# Patient Record
Sex: FEMALE | Race: WHITE | Employment: FULL TIME | URBAN - METROPOLITAN AREA
[De-identification: names, ages, dates, MRNs, and addresses within clinical notes are randomized per-mention and may not be internally consistent; named-entity substitution may affect disease eponyms.]

---

## 2023-08-28 LAB
ABO, EXTERNAL RESULT: NORMAL
HEP B, EXTERNAL RESULT: NORMAL
HIV, EXTERNAL RESULT: NORMAL
N. GONORRHOEAE, EXTERNAL RESULT: NEGATIVE
RUBELLA TITER, EXTERNAL RESULT: NORMAL
T. PALLIDUM (SYPHILIS) ANTIBODY, EXTERNAL RESULT: NORMAL

## 2024-03-02 LAB — GBS, EXTERNAL RESULT: POSITIVE

## 2024-04-01 ENCOUNTER — ANESTHESIA EVENT (OUTPATIENT)
Dept: LABOR AND DELIVERY | Facility: HOSPITAL | Age: 29
End: 2024-04-01
Payer: COMMERCIAL

## 2024-04-01 ENCOUNTER — ANESTHESIA (OUTPATIENT)
Dept: LABOR AND DELIVERY | Facility: HOSPITAL | Age: 29
End: 2024-04-01
Payer: COMMERCIAL

## 2024-04-01 ENCOUNTER — HOSPITAL ENCOUNTER (INPATIENT)
Facility: HOSPITAL | Age: 29
LOS: 4 days | Discharge: HOME | End: 2024-04-05
Attending: OBSTETRICS & GYNECOLOGY | Admitting: OBSTETRICS & GYNECOLOGY
Payer: COMMERCIAL

## 2024-04-01 LAB
AMPHET UR QL SCN: NEGATIVE
BARBITURATES UR QL SCN: NEGATIVE
BASOPHILS # BLD: 0 K/UL (ref 0–0.1)
BASOPHILS NFR BLD: 0 % (ref 0–1)
BENZODIAZ UR QL: NEGATIVE
CANNABINOIDS UR QL SCN: NEGATIVE
COCAINE UR QL SCN: NEGATIVE
DIFFERENTIAL METHOD BLD: ABNORMAL
EOSINOPHIL # BLD: 0 K/UL (ref 0–0.4)
EOSINOPHIL NFR BLD: 0 % (ref 0–7)
ERYTHROCYTE [DISTWIDTH] IN BLOOD BY AUTOMATED COUNT: 12.4 % (ref 11.5–14.5)
HCT VFR BLD AUTO: 39 % (ref 35–47)
HGB BLD-MCNC: 13.7 G/DL (ref 11.5–16)
IMM GRANULOCYTES # BLD AUTO: 0.1 K/UL (ref 0–0.04)
IMM GRANULOCYTES NFR BLD AUTO: 1 % (ref 0–0.5)
LYMPHOCYTES # BLD: 2.3 K/UL (ref 0.8–3.5)
LYMPHOCYTES NFR BLD: 21 % (ref 12–49)
Lab: NORMAL
MCH RBC QN AUTO: 33.7 PG (ref 26–34)
MCHC RBC AUTO-ENTMCNC: 35.1 G/DL (ref 30–36.5)
MCV RBC AUTO: 96.1 FL (ref 80–99)
METHADONE UR QL: NEGATIVE
MONOCYTES # BLD: 0.8 K/UL (ref 0–1)
MONOCYTES NFR BLD: 7 % (ref 5–13)
NEUTS SEG # BLD: 7.5 K/UL (ref 1.8–8)
NEUTS SEG NFR BLD: 71 % (ref 32–75)
NRBC # BLD: 0 K/UL (ref 0–0.01)
NRBC BLD-RTO: 0 PER 100 WBC
OPIATES UR QL: NEGATIVE
PCP UR QL: NEGATIVE
PLATELET # BLD AUTO: 175 K/UL (ref 150–400)
PMV BLD AUTO: 11.1 FL (ref 8.9–12.9)
RBC # BLD AUTO: 4.06 M/UL (ref 3.8–5.2)
WBC # BLD AUTO: 10.8 K/UL (ref 3.6–11)

## 2024-04-01 PROCEDURE — 36415 COLL VENOUS BLD VENIPUNCTURE: CPT

## 2024-04-01 PROCEDURE — 3700000025 EPIDURAL BLOCK: Performed by: ANESTHESIOLOGY

## 2024-04-01 PROCEDURE — 86901 BLOOD TYPING SEROLOGIC RH(D): CPT

## 2024-04-01 PROCEDURE — 86850 RBC ANTIBODY SCREEN: CPT

## 2024-04-01 PROCEDURE — 2500000003 HC RX 250 WO HCPCS

## 2024-04-01 PROCEDURE — 1120000000 HC RM PRIVATE OB

## 2024-04-01 PROCEDURE — 6360000002 HC RX W HCPCS: Performed by: ANESTHESIOLOGY

## 2024-04-01 PROCEDURE — 2580000003 HC RX 258: Performed by: OBSTETRICS & GYNECOLOGY

## 2024-04-01 PROCEDURE — 7210000100 HC LABOR FEE PER 1 HR

## 2024-04-01 PROCEDURE — 86900 BLOOD TYPING SEROLOGIC ABO: CPT

## 2024-04-01 PROCEDURE — 80307 DRUG TEST PRSMV CHEM ANLYZR: CPT

## 2024-04-01 PROCEDURE — 00HU33Z INSERTION OF INFUSION DEVICE INTO SPINAL CANAL, PERCUTANEOUS APPROACH: ICD-10-PCS | Performed by: ANESTHESIOLOGY

## 2024-04-01 PROCEDURE — 99203 OFFICE O/P NEW LOW 30 MIN: CPT

## 2024-04-01 PROCEDURE — 6360000002 HC RX W HCPCS: Performed by: OBSTETRICS & GYNECOLOGY

## 2024-04-01 PROCEDURE — 85025 COMPLETE CBC W/AUTO DIFF WBC: CPT

## 2024-04-01 RX ORDER — DOCUSATE SODIUM 100 MG/1
100 CAPSULE, LIQUID FILLED ORAL 2 TIMES DAILY
Status: DISCONTINUED | OUTPATIENT
Start: 2024-04-01 | End: 2024-04-02

## 2024-04-01 RX ORDER — FENTANYL/BUPIVACAINE/NS/PF 2-1250MCG
PLASTIC BAG, INJECTION (ML) INJECTION
Status: COMPLETED
Start: 2024-04-01 | End: 2024-04-01

## 2024-04-01 RX ORDER — MISOPROSTOL 200 UG/1
400 TABLET ORAL PRN
Status: DISCONTINUED | OUTPATIENT
Start: 2024-04-01 | End: 2024-04-02

## 2024-04-01 RX ORDER — SODIUM CHLORIDE, SODIUM LACTATE, POTASSIUM CHLORIDE, AND CALCIUM CHLORIDE .6; .31; .03; .02 G/100ML; G/100ML; G/100ML; G/100ML
500 INJECTION, SOLUTION INTRAVENOUS PRN
Status: DISCONTINUED | OUTPATIENT
Start: 2024-04-01 | End: 2024-04-02

## 2024-04-01 RX ORDER — ONDANSETRON 4 MG/1
4 TABLET, ORALLY DISINTEGRATING ORAL EVERY 6 HOURS PRN
Status: DISCONTINUED | OUTPATIENT
Start: 2024-04-01 | End: 2024-04-02

## 2024-04-01 RX ORDER — FENTANYL/BUPIVACAINE/NS/PF 2-1250MCG
12 PLASTIC BAG, INJECTION (ML) INJECTION CONTINUOUS
Status: DISCONTINUED | OUTPATIENT
Start: 2024-04-02 | End: 2024-04-02

## 2024-04-01 RX ORDER — SODIUM CHLORIDE, SODIUM LACTATE, POTASSIUM CHLORIDE, AND CALCIUM CHLORIDE .6; .31; .03; .02 G/100ML; G/100ML; G/100ML; G/100ML
1000 INJECTION, SOLUTION INTRAVENOUS PRN
Status: DISCONTINUED | OUTPATIENT
Start: 2024-04-01 | End: 2024-04-02

## 2024-04-01 RX ORDER — CARBOPROST TROMETHAMINE 250 UG/ML
250 INJECTION, SOLUTION INTRAMUSCULAR PRN
Status: DISCONTINUED | OUTPATIENT
Start: 2024-04-01 | End: 2024-04-02

## 2024-04-01 RX ORDER — SODIUM CHLORIDE 9 MG/ML
25 INJECTION, SOLUTION INTRAVENOUS PRN
Status: DISCONTINUED | OUTPATIENT
Start: 2024-04-01 | End: 2024-04-02

## 2024-04-01 RX ORDER — SODIUM CHLORIDE 0.9 % (FLUSH) 0.9 %
5-40 SYRINGE (ML) INJECTION PRN
Status: DISCONTINUED | OUTPATIENT
Start: 2024-04-01 | End: 2024-04-02

## 2024-04-01 RX ORDER — TERBUTALINE SULFATE 1 MG/ML
0.25 INJECTION, SOLUTION SUBCUTANEOUS
Status: DISCONTINUED | OUTPATIENT
Start: 2024-04-01 | End: 2024-04-02

## 2024-04-01 RX ORDER — NALOXONE HYDROCHLORIDE 0.4 MG/ML
INJECTION, SOLUTION INTRAMUSCULAR; INTRAVENOUS; SUBCUTANEOUS PRN
Status: DISCONTINUED | OUTPATIENT
Start: 2024-04-01 | End: 2024-04-02

## 2024-04-01 RX ORDER — METHYLERGONOVINE MALEATE 0.2 MG/ML
200 INJECTION INTRAVENOUS PRN
Status: DISCONTINUED | OUTPATIENT
Start: 2024-04-01 | End: 2024-04-02

## 2024-04-01 RX ORDER — FENTANYL CITRATE 50 UG/ML
25 INJECTION, SOLUTION INTRAMUSCULAR; INTRAVENOUS
Status: DISCONTINUED | OUTPATIENT
Start: 2024-04-01 | End: 2024-04-02

## 2024-04-01 RX ORDER — BUPIVACAINE HYDROCHLORIDE 2.5 MG/ML
INJECTION, SOLUTION EPIDURAL; INFILTRATION; INTRACAUDAL
Status: COMPLETED
Start: 2024-04-01 | End: 2024-04-01

## 2024-04-01 RX ORDER — ONDANSETRON 2 MG/ML
4 INJECTION INTRAMUSCULAR; INTRAVENOUS EVERY 6 HOURS PRN
Status: DISCONTINUED | OUTPATIENT
Start: 2024-04-01 | End: 2024-04-02

## 2024-04-01 RX ORDER — BUPIVACAINE HYDROCHLORIDE 2.5 MG/ML
INJECTION, SOLUTION EPIDURAL; INFILTRATION; INTRACAUDAL PRN
Status: DISCONTINUED | OUTPATIENT
Start: 2024-04-01 | End: 2024-04-02 | Stop reason: SDUPTHER

## 2024-04-01 RX ORDER — SODIUM CHLORIDE, SODIUM LACTATE, POTASSIUM CHLORIDE, CALCIUM CHLORIDE 600; 310; 30; 20 MG/100ML; MG/100ML; MG/100ML; MG/100ML
INJECTION, SOLUTION INTRAVENOUS CONTINUOUS
Status: DISCONTINUED | OUTPATIENT
Start: 2024-04-01 | End: 2024-04-02

## 2024-04-01 RX ORDER — SODIUM CHLORIDE 0.9 % (FLUSH) 0.9 %
5-40 SYRINGE (ML) INJECTION EVERY 12 HOURS SCHEDULED
Status: DISCONTINUED | OUTPATIENT
Start: 2024-04-01 | End: 2024-04-02

## 2024-04-01 RX ADMIN — Medication 12 ML/HR: at 23:50

## 2024-04-01 RX ADMIN — BUPIVACAINE HYDROCHLORIDE 20 ML: 2.5 INJECTION, SOLUTION EPIDURAL; INFILTRATION; INTRACAUDAL; PERINEURAL at 23:39

## 2024-04-01 RX ADMIN — SODIUM CHLORIDE 5 MILLION UNITS: 900 INJECTION INTRAVENOUS at 22:48

## 2024-04-01 RX ADMIN — SODIUM CHLORIDE, POTASSIUM CHLORIDE, SODIUM LACTATE AND CALCIUM CHLORIDE 1000 ML: 600; 310; 30; 20 INJECTION, SOLUTION INTRAVENOUS at 22:47

## 2024-04-02 LAB
ABO + RH BLD: NORMAL
BLOOD GROUP ANTIBODIES SERPL: NORMAL
SPECIMEN EXP DATE BLD: NORMAL

## 2024-04-02 PROCEDURE — 3700000156 HC EPIDURAL ANESTHESIA

## 2024-04-02 PROCEDURE — 6360000002 HC RX W HCPCS: Performed by: REGISTERED NURSE

## 2024-04-02 PROCEDURE — 2500000003 HC RX 250 WO HCPCS: Performed by: REGISTERED NURSE

## 2024-04-02 PROCEDURE — 2580000003 HC RX 258: Performed by: REGISTERED NURSE

## 2024-04-02 PROCEDURE — 2500000003 HC RX 250 WO HCPCS: Performed by: ANESTHESIOLOGY

## 2024-04-02 PROCEDURE — 7100000000 HC PACU RECOVERY - FIRST 15 MIN: Performed by: STUDENT IN AN ORGANIZED HEALTH CARE EDUCATION/TRAINING PROGRAM

## 2024-04-02 PROCEDURE — 1120000000 HC RM PRIVATE OB

## 2024-04-02 PROCEDURE — 6360000002 HC RX W HCPCS: Performed by: STUDENT IN AN ORGANIZED HEALTH CARE EDUCATION/TRAINING PROGRAM

## 2024-04-02 PROCEDURE — 2709999900 HC NON-CHARGEABLE SUPPLY: Performed by: STUDENT IN AN ORGANIZED HEALTH CARE EDUCATION/TRAINING PROGRAM

## 2024-04-02 PROCEDURE — 2580000003 HC RX 258: Performed by: STUDENT IN AN ORGANIZED HEALTH CARE EDUCATION/TRAINING PROGRAM

## 2024-04-02 PROCEDURE — 2580000003 HC RX 258: Performed by: OBSTETRICS & GYNECOLOGY

## 2024-04-02 PROCEDURE — 7100000001 HC PACU RECOVERY - ADDTL 15 MIN: Performed by: STUDENT IN AN ORGANIZED HEALTH CARE EDUCATION/TRAINING PROGRAM

## 2024-04-02 PROCEDURE — 10H07YZ INSERTION OF OTHER DEVICE INTO PRODUCTS OF CONCEPTION, VIA NATURAL OR ARTIFICIAL OPENING: ICD-10-PCS | Performed by: OBSTETRICS & GYNECOLOGY

## 2024-04-02 PROCEDURE — 6360000002 HC RX W HCPCS: Performed by: OBSTETRICS & GYNECOLOGY

## 2024-04-02 PROCEDURE — 76815 OB US LIMITED FETUS(S): CPT

## 2024-04-02 PROCEDURE — 3700000000 HC ANESTHESIA ATTENDED CARE: Performed by: STUDENT IN AN ORGANIZED HEALTH CARE EDUCATION/TRAINING PROGRAM

## 2024-04-02 PROCEDURE — 3609079900 HC CESAREAN SECTION: Performed by: STUDENT IN AN ORGANIZED HEALTH CARE EDUCATION/TRAINING PROGRAM

## 2024-04-02 PROCEDURE — 99465 NB RESUSCITATION: CPT

## 2024-04-02 PROCEDURE — 36415 COLL VENOUS BLD VENIPUNCTURE: CPT

## 2024-04-02 PROCEDURE — 51702 INSERT TEMP BLADDER CATH: CPT

## 2024-04-02 PROCEDURE — 7210000100 HC LABOR FEE PER 1 HR

## 2024-04-02 PROCEDURE — 3700000001 HC ADD 15 MINUTES (ANESTHESIA): Performed by: STUDENT IN AN ORGANIZED HEALTH CARE EDUCATION/TRAINING PROGRAM

## 2024-04-02 RX ORDER — ONDANSETRON 2 MG/ML
4 INJECTION INTRAMUSCULAR; INTRAVENOUS EVERY 6 HOURS PRN
Status: DISCONTINUED | OUTPATIENT
Start: 2024-04-02 | End: 2024-04-05 | Stop reason: HOSPADM

## 2024-04-02 RX ORDER — SODIUM CHLORIDE 0.9 % (FLUSH) 0.9 %
5-40 SYRINGE (ML) INJECTION PRN
Status: DISCONTINUED | OUTPATIENT
Start: 2024-04-02 | End: 2024-04-05 | Stop reason: HOSPADM

## 2024-04-02 RX ORDER — FENTANYL CITRATE 50 UG/ML
INJECTION, SOLUTION INTRAMUSCULAR; INTRAVENOUS PRN
Status: DISCONTINUED | OUTPATIENT
Start: 2024-04-02 | End: 2024-04-02 | Stop reason: SDUPTHER

## 2024-04-02 RX ORDER — ACETAMINOPHEN 500 MG
1000 TABLET ORAL EVERY 8 HOURS
Status: DISCONTINUED | OUTPATIENT
Start: 2024-04-03 | End: 2024-04-05 | Stop reason: HOSPADM

## 2024-04-02 RX ORDER — CYCLOBENZAPRINE HCL 10 MG
10 TABLET ORAL 3 TIMES DAILY PRN
Status: DISCONTINUED | OUTPATIENT
Start: 2024-04-02 | End: 2024-04-05 | Stop reason: HOSPADM

## 2024-04-02 RX ORDER — AZITHROMYCIN 500 MG/1
INJECTION, POWDER, LYOPHILIZED, FOR SOLUTION INTRAVENOUS
Status: DISCONTINUED
Start: 2024-04-02 | End: 2024-04-02

## 2024-04-02 RX ORDER — SODIUM CHLORIDE 0.9 % (FLUSH) 0.9 %
5-40 SYRINGE (ML) INJECTION EVERY 12 HOURS SCHEDULED
Status: DISCONTINUED | OUTPATIENT
Start: 2024-04-02 | End: 2024-04-05 | Stop reason: HOSPADM

## 2024-04-02 RX ORDER — LIDOCAINE HCL/EPINEPHRINE/PF 2%-1:200K
VIAL (ML) INJECTION PRN
Status: DISCONTINUED | OUTPATIENT
Start: 2024-04-02 | End: 2024-04-02 | Stop reason: SDUPTHER

## 2024-04-02 RX ORDER — OXYCODONE HYDROCHLORIDE 5 MG/1
5 TABLET ORAL EVERY 4 HOURS PRN
Status: DISCONTINUED | OUTPATIENT
Start: 2024-04-02 | End: 2024-04-03

## 2024-04-02 RX ORDER — ACETAMINOPHEN 500 MG
1000 TABLET ORAL EVERY 8 HOURS SCHEDULED
Status: DISCONTINUED | OUTPATIENT
Start: 2024-04-02 | End: 2024-04-02

## 2024-04-02 RX ORDER — DEXAMETHASONE SODIUM PHOSPHATE 4 MG/ML
INJECTION, SOLUTION INTRA-ARTICULAR; INTRALESIONAL; INTRAMUSCULAR; INTRAVENOUS; SOFT TISSUE PRN
Status: DISCONTINUED | OUTPATIENT
Start: 2024-04-02 | End: 2024-04-02 | Stop reason: SDUPTHER

## 2024-04-02 RX ORDER — KETOROLAC TROMETHAMINE 30 MG/ML
INJECTION, SOLUTION INTRAMUSCULAR; INTRAVENOUS PRN
Status: DISCONTINUED | OUTPATIENT
Start: 2024-04-02 | End: 2024-04-02 | Stop reason: SDUPTHER

## 2024-04-02 RX ORDER — LANOLIN/MINERAL OIL
LOTION (ML) TOPICAL
Status: DISCONTINUED | OUTPATIENT
Start: 2024-04-02 | End: 2024-04-05 | Stop reason: HOSPADM

## 2024-04-02 RX ORDER — DOCUSATE SODIUM 100 MG/1
100 CAPSULE, LIQUID FILLED ORAL 2 TIMES DAILY
Status: DISCONTINUED | OUTPATIENT
Start: 2024-04-02 | End: 2024-04-05 | Stop reason: HOSPADM

## 2024-04-02 RX ORDER — ONDANSETRON 4 MG/1
4 TABLET, ORALLY DISINTEGRATING ORAL EVERY 8 HOURS PRN
Status: DISCONTINUED | OUTPATIENT
Start: 2024-04-02 | End: 2024-04-05 | Stop reason: HOSPADM

## 2024-04-02 RX ORDER — IBUPROFEN 400 MG/1
800 TABLET ORAL EVERY 8 HOURS
Status: DISCONTINUED | OUTPATIENT
Start: 2024-04-03 | End: 2024-04-05 | Stop reason: HOSPADM

## 2024-04-02 RX ORDER — CEFAZOLIN SODIUM/WATER 2 G/20 ML
SYRINGE (ML) INTRAVENOUS PRN
Status: DISCONTINUED | OUTPATIENT
Start: 2024-04-02 | End: 2024-04-02 | Stop reason: SDUPTHER

## 2024-04-02 RX ORDER — SODIUM CHLORIDE, SODIUM LACTATE, POTASSIUM CHLORIDE, CALCIUM CHLORIDE 600; 310; 30; 20 MG/100ML; MG/100ML; MG/100ML; MG/100ML
INJECTION, SOLUTION INTRAVENOUS CONTINUOUS
Status: DISCONTINUED | OUTPATIENT
Start: 2024-04-02 | End: 2024-04-05 | Stop reason: HOSPADM

## 2024-04-02 RX ORDER — PROCHLORPERAZINE EDISYLATE 5 MG/ML
10 INJECTION INTRAMUSCULAR; INTRAVENOUS ONCE
Status: COMPLETED | OUTPATIENT
Start: 2024-04-02 | End: 2024-04-02

## 2024-04-02 RX ORDER — SODIUM CHLORIDE 9 MG/ML
INJECTION, SOLUTION INTRAVENOUS PRN
Status: DISCONTINUED | OUTPATIENT
Start: 2024-04-02 | End: 2024-04-05 | Stop reason: HOSPADM

## 2024-04-02 RX ORDER — PHENYLEPHRINE HCL IN 0.9% NACL 0.4MG/10ML
SYRINGE (ML) INTRAVENOUS PRN
Status: DISCONTINUED | OUTPATIENT
Start: 2024-04-02 | End: 2024-04-02 | Stop reason: SDUPTHER

## 2024-04-02 RX ORDER — ONDANSETRON 2 MG/ML
INJECTION INTRAMUSCULAR; INTRAVENOUS PRN
Status: DISCONTINUED | OUTPATIENT
Start: 2024-04-02 | End: 2024-04-02 | Stop reason: SDUPTHER

## 2024-04-02 RX ORDER — KETOROLAC TROMETHAMINE 30 MG/ML
30 INJECTION, SOLUTION INTRAMUSCULAR; INTRAVENOUS EVERY 6 HOURS
Status: DISPENSED | OUTPATIENT
Start: 2024-04-02 | End: 2024-04-03

## 2024-04-02 RX ORDER — FLUVOXAMINE MALEATE 50 MG/1
100 TABLET, COATED ORAL NIGHTLY
Status: DISCONTINUED | OUTPATIENT
Start: 2024-04-02 | End: 2024-04-05 | Stop reason: HOSPADM

## 2024-04-02 RX ORDER — FLUVOXAMINE MALEATE 50 MG/1
100 TABLET, COATED ORAL NIGHTLY
Status: DISCONTINUED | OUTPATIENT
Start: 2024-04-02 | End: 2024-04-02

## 2024-04-02 RX ORDER — SODIUM CHLORIDE 9 MG/ML
INJECTION, SOLUTION INTRAVENOUS CONTINUOUS PRN
Status: DISCONTINUED | OUTPATIENT
Start: 2024-04-02 | End: 2024-04-02 | Stop reason: SDUPTHER

## 2024-04-02 RX ORDER — FLUVOXAMINE MALEATE 100 MG
100 TABLET ORAL NIGHTLY
COMMUNITY

## 2024-04-02 RX ORDER — MORPHINE SULFATE 0.5 MG/ML
INJECTION, SOLUTION EPIDURAL; INTRATHECAL; INTRAVENOUS PRN
Status: DISCONTINUED | OUTPATIENT
Start: 2024-04-02 | End: 2024-04-02 | Stop reason: SDUPTHER

## 2024-04-02 RX ORDER — FLUOXETINE HYDROCHLORIDE 20 MG/1
100 CAPSULE ORAL
Status: DISCONTINUED | OUTPATIENT
Start: 2024-04-02 | End: 2024-04-02

## 2024-04-02 RX ADMIN — KETOROLAC TROMETHAMINE 30 MG: 30 INJECTION, SOLUTION INTRAMUSCULAR at 14:45

## 2024-04-02 RX ADMIN — MORPHINE SULFATE 5 MG: 0.5 INJECTION, SOLUTION EPIDURAL; INTRATHECAL; INTRAVENOUS at 14:33

## 2024-04-02 RX ADMIN — OXYTOCIN 1 MILLI-UNITS/MIN: 10 INJECTION, SOLUTION INTRAMUSCULAR; INTRAVENOUS at 05:04

## 2024-04-02 RX ADMIN — PROCHLORPERAZINE EDISYLATE 10 MG: 5 INJECTION INTRAMUSCULAR; INTRAVENOUS at 22:12

## 2024-04-02 RX ADMIN — Medication 80 MCG: at 14:17

## 2024-04-02 RX ADMIN — SODIUM CHLORIDE 2.5 MILLION UNITS: 9 INJECTION, SOLUTION INTRAVENOUS at 08:01

## 2024-04-02 RX ADMIN — Medication 80 MCG: at 14:15

## 2024-04-02 RX ADMIN — ONDANSETRON 4 MG: 2 INJECTION INTRAMUSCULAR; INTRAVENOUS at 20:13

## 2024-04-02 RX ADMIN — SODIUM CHLORIDE, POTASSIUM CHLORIDE, SODIUM LACTATE AND CALCIUM CHLORIDE: 600; 310; 30; 20 INJECTION, SOLUTION INTRAVENOUS at 15:05

## 2024-04-02 RX ADMIN — OXYTOCIN 87.3 MILLI-UNITS/MIN: 10 INJECTION, SOLUTION INTRAMUSCULAR; INTRAVENOUS at 15:05

## 2024-04-02 RX ADMIN — SODIUM CHLORIDE: 9 INJECTION, SOLUTION INTRAVENOUS at 13:59

## 2024-04-02 RX ADMIN — FENTANYL CITRATE 25 MCG: 50 INJECTION, SOLUTION INTRAMUSCULAR; INTRAVENOUS at 14:49

## 2024-04-02 RX ADMIN — Medication 12 ML/HR: at 12:41

## 2024-04-02 RX ADMIN — Medication 2000 MG: at 14:08

## 2024-04-02 RX ADMIN — ONDANSETRON 4 MG: 2 INJECTION INTRAMUSCULAR; INTRAVENOUS at 14:02

## 2024-04-02 RX ADMIN — DEXAMETHASONE SODIUM PHOSPHATE 4 MG: 4 INJECTION INTRA-ARTICULAR; INTRALESIONAL; INTRAMUSCULAR; INTRAVENOUS; SOFT TISSUE at 14:20

## 2024-04-02 RX ADMIN — LIDOCAINE HYDROCHLORIDE,EPINEPHRINE BITARTRATE 20 ML: 20; .005 INJECTION, SOLUTION EPIDURAL; INFILTRATION; INTRACAUDAL; PERINEURAL at 14:00

## 2024-04-02 RX ADMIN — SODIUM CHLORIDE 2.5 MILLION UNITS: 9 INJECTION, SOLUTION INTRAVENOUS at 12:44

## 2024-04-02 RX ADMIN — Medication 12 ML/HR: at 05:34

## 2024-04-02 RX ADMIN — SODIUM CHLORIDE, PRESERVATIVE FREE 10 ML: 5 INJECTION INTRAVENOUS at 20:00

## 2024-04-02 RX ADMIN — SODIUM CHLORIDE, POTASSIUM CHLORIDE, SODIUM LACTATE AND CALCIUM CHLORIDE: 600; 310; 30; 20 INJECTION, SOLUTION INTRAVENOUS at 07:57

## 2024-04-02 RX ADMIN — SODIUM CHLORIDE 2.5 MILLION UNITS: 9 INJECTION, SOLUTION INTRAVENOUS at 03:45

## 2024-04-02 RX ADMIN — PHENYLEPHRINE HYDROCHLORIDE 40 MCG/MIN: 10 INJECTION INTRAVENOUS at 14:17

## 2024-04-02 RX ADMIN — KETOROLAC TROMETHAMINE 30 MG: 30 INJECTION, SOLUTION INTRAMUSCULAR at 20:12

## 2024-04-02 RX ADMIN — FENTANYL CITRATE 25 MCG: 50 INJECTION, SOLUTION INTRAMUSCULAR; INTRAVENOUS at 14:56

## 2024-04-02 RX ADMIN — FENTANYL CITRATE 25 MCG: 50 INJECTION, SOLUTION INTRAMUSCULAR; INTRAVENOUS at 14:54

## 2024-04-02 RX ADMIN — SODIUM CHLORIDE, POTASSIUM CHLORIDE, SODIUM LACTATE AND CALCIUM CHLORIDE: 600; 310; 30; 20 INJECTION, SOLUTION INTRAVENOUS at 23:52

## 2024-04-02 RX ADMIN — FENTANYL CITRATE 25 MCG: 50 INJECTION, SOLUTION INTRAMUSCULAR; INTRAVENOUS at 14:53

## 2024-04-02 NOTE — PROGRESS NOTES
Variable decelerations noted and called to room for evaluation. Patient noted to be fully dilated and room set up for delivery.     Patient encouraged to push with contractions. At this time multiple golf ball sized clots noted in vaginal canal which were removed during the initial pushing efforts.     During evaluation while pushing, fetus noted to be in straight OT position with occiput towards maternal left. Fetal head manually rotated to OA position and pushing continued. Cat I tracing throughout.     After 2-3 more contractions, suture orientation again assessed and felt again to be OT. During this evaluation, cord was palpated past fetal ears, coming into the vaginal canal. This cord was immediately reduced back past fetal head.     FSE was immediately placed which noted FHTs to be in the 70s. OR was opened and patient was taken back emergently.     In OR FSE showed FHTs to be in 130s with good variability and no decelerations. Fetal station was assessed and felt to be still 0. During transit to the OR, vaginal bleeding increased significantly with approximately 100cc of clot on chux beneath patient.     In the setting of increased vaginal bleeding, high fetal station remote from delivery with OT position, and cord prolapse (though now reduced behind fetal head) the decision was made to proceed with a  section.     Tiffani Corrales MD  2024  3:08 PM

## 2024-04-02 NOTE — PROGRESS NOTES
Cat 1, RNST. Patient comfortable and sleeping after epidural. Contractions have spaced out to Q 6-8 min. Will start Pitocin augmentation for inadequate contractions and ROM now x 7 hrs.

## 2024-04-02 NOTE — PROGRESS NOTES
S: Doing well. Very comfortable with epidural.     O: /68   Pulse 76   Temp 98.6 °F (37 °C) (Oral)   Resp 16   Ht 1.626 m (5' 4\")   Wt 77.1 kg (170 lb)   SpO2 95%   BMI 29.18 kg/m²     Cervix: 5/90/-1    CEFM: 140s/mod bc/+accels/no decels  [place]: regular ctx q2-3 min     A/P:    - Doing well, adequate contractions, pit on 8  - Anticipate TSVD    Tiffani Corrales MD  4/2/2024  11:43 AM

## 2024-04-02 NOTE — PROGRESS NOTES
0705: Bedside and Verbal shift change report given to KAVYA Ndiaye RN (oncoming nurse) by DAVID Angeles RN (offgoing nurse). Report included the following information Nurse Handoff Report and MAR.     0710: Shift assessment completed.    0726: Dr Dove at bedside to assess. SVE done with patients consent. MD discussed need for IUPC due to amniotic fluid level and pitocin management. Bedside ultrasound performed by MD. MD gave verbal order to RN to give 500ml bolus with no continuous rate after bolus complete.    0752: Amnioinfusion started.    0841: Amnioinfusion complete.    1121: Dr Corrales at bedside to assess labor progress. SVE done with patient's consent. Plan to continue with repositioning and titrating pitocin as indicated by the order.    1318: SVE done by KAVYA Ndiaye RN with patient's consent. Dilation complete.     1319: Pitocin stopped due to continued fetal decels.    1321: Dr Corrales at bedside for delivery.     1326: Patient begins pushing. RN and MD remains in continuous attendance at the bedside. Assessment & evaluation of fetal heart rate ongoing via continuous EFM.     1352: Provider performed SVE to confirm fetal head presentation. Upon examination the umbilical cord was visualized by this RN outside of the vaginal opening. This RN notified charge nurse NAKIA Galvez to open OR. MD kept hand in the vagina and upon applying pressure to the fetal head the umbilical cord was no longer visualized. MD remained at bedside and advised that head was now well applied and patient could continue to push.    1353: Decreased fetal heart noted on tracing by MD and RN. Patient repositioned.    1355: C/S called and patient verbally consented. Patient transported to OR.    1356: Patient in OR.    1357:  on EFM. SVE performed by Dr Corrales and head well applied to cervix per MD. MD instructs patient to resume pushing while in OR. Maternal effort wasn't making significant change in descent per MD. MD discussed with

## 2024-04-02 NOTE — H&P
Department of Obstetrics and Gynecology  Attending Obstetrics History and Physical        CHIEF COMPLAINT:  watery vag discharge    HISTORY OF PRESENT ILLNESS:      The patient is a 28 y.o.  1 parity 0 at 41  weeks.  Patient presents with a chief complaint as above and is being admitted for SROM at 2100 hrs. Patient with regular contractions Q 5 minutes. No vaginal bleeding or decreased FM.[facility] EMR reviewed from 1/10/24 through 24.    DATES:      Estimated Due Date:  24    PRENATAL CARE:    Provider:  Emma    Blood Type/Rh:  O+  Antibody Screen:  Neg  Rubella:  Immune  RPR:  NR  Hepatitis B Surface Antigen: Neg  HIV:  neg  Gonorrhea:  Neg  Chlamydia:  Neg  1 hour Glucose Tolerance Test:  90  Group B Strep:  Positive      PAST OB HISTORY        Depression:  No      Post-partum depression:  No      Diabetes:  No      Gestational Diabetes:  No      Thyroid Disease:  No      Chronic HTN:  No      Gestation HTN:  No      Pre-eclampsia:  No      Seizure disorder:  No      Asthma:  No      Clotting disorder:  No      :  No      Tubal ligation:  No      D & C:  No      Cerclage:  No      LEEP:  No      Myomectomy:  No    OB History    Para Term  AB Living   1             SAB IAB Ectopic Molar Multiple Live Births                    # Outcome Date GA Lbr Mitchel/2nd Weight Sex Delivery Anes PTL Lv   1 Current                  Past Medical History:    Anxiety  IBS  Past Surgical History:    No past surgical history on file.  Social History:    No ETON,drugs, Non-smoker  Family History:   No family history on file.  Medications Prior to Admission:  No medications prior to admission.  Allergies:  Patient has no known allergies.    REVIEW OF SYSTEMS:      CONSTITUTIONAL:  negative for  fevers, chills, and sweats  EYES:  negative for  double vision and blurred vision  HEENT:  negative for  sore throat  RESPIRATORY:  negative for  dry cough, cough with sputum, dyspnea, and

## 2024-04-02 NOTE — ANESTHESIA PRE PROCEDURE
Department of Anesthesiology  Preprocedure Note       Name:  Stacy Redd   Age:  28 y.o.  :  1995                                          MRN:  [id number]         Date:  2024      Surgeon: * No surgeons listed *    Procedure: * No procedures listed *    Medications prior to admission:   Prior to Admission medications    Not on File       Current medications:    Current Facility-Administered Medications   Medication Dose Route Frequency Provider Last Rate Last Admin   • lactated ringers IV soln infusion   IntraVENous Continuous Severo Dove MD       • lactated ringers bolus 500 mL  500 mL IntraVENous PRN Severo Dove MD        Or   • lactated ringers bolus 1,000 mL  1,000 mL IntraVENous PRN Severo Dove .9 mL/hr at 247 1,000 mL at 24   • sodium chloride flush 0.9 % injection 5-40 mL  5-40 mL IntraVENous 2 times per day Severo Dove MD       • sodium chloride flush 0.9 % injection 5-40 mL  5-40 mL IntraVENous PRN Severo Dove MD       • 0.9 % sodium chloride infusion  25 mL IntraVENous PRN Severo Dove MD       • methylergonovine (METHERGINE) injection 200 mcg  200 mcg IntraMUSCular PRN Severo Dove MD       • carboprost (HEMABATE) injection 250 mcg  250 mcg IntraMUSCular PRN Severo Dove MD       • miSOPROStol (CYTOTEC) tablet 400 mcg  400 mcg Buccal PRN Severo Dove MD       • tranexamic acid (CYKLOKAPRON) 1,000 mg in sodium chloride 0.9 % 110 mL IVPB (mini-bag)  1,000 mg IntraVENous Once PRN Severo Dove MD       • oxytocin (PITOCIN) 30 units in 500 mL infusion  87.3 alvarez-units/min IntraVENous Continuous PRN Severo Dove MD        And   • oxytocin (PITOCIN) 10 unit bolus from the bag  10 Units IntraVENous PRN Severo Dove MD       • terbutaline (BRETHINE) injection 0.25 mg  0.25 mg SubCUTAneous Once PRN Severo Dove MD       • ondansetron (ZOFRAN) injection 4 mg  4 mg IntraVENous Q6H PRN Severo Dove MD        Or   •

## 2024-04-02 NOTE — ANESTHESIA PROCEDURE NOTES
Epidural Block    Patient location during procedure: OB  Start time: 4/1/2024 11:35 PM  End time: 4/1/2024 11:47 PM  Reason for block: labor epidural  Staffing  Anesthesiologist: Rasta Suazo MD  Performed by: Rasta Suazo MD  Authorized by: Rasta Suazo MD    Epidural  Patient position: sitting  Prep: DuraPrep  Patient monitoring: continuous pulse ox and frequent blood pressure checks  Approach: midline  Location: L3-4  Injection technique: LEO air  Provider prep: mask and sterile gloves  Needle  Needle type: Tuohy   Needle gauge: 18 G  Needle length: 3.5 in  Catheter type: end hole  Catheter size: 20 G  Test dose: negativeCatheter Secured: tegaderm and tape  Assessment  Sensory level: T8  Hemodynamics: stable  Attempts: 1  Outcomes: uncomplicated and patient tolerated procedure well  Preanesthetic Checklist  Completed: patient identified, IV checked, site marked, risks and benefits discussed, surgical/procedural consents, equipment checked, pre-op evaluation, timeout performed, anesthesia consent given, oxygen available, monitors applied/VS acknowledged, fire risk safety assessment completed and verbalized and blood product R/B/A discussed and consented

## 2024-04-02 NOTE — L&D DELIVERY NOTE
laceration       Blood Loss  Mother: Stacy Redd #[id number]     Start of Mother's Information      Delivery Blood Loss  24 1356 - 24 1520      Quantitative Blood Loss (mL) Hospital Encounter 602 grams    Total  602 mL               End of Mother's Information  Mother: Stacy Redd #[id number]                Delivery Providers    Delivering clinician: Tiffani Corrales MD     Provider Role    Tiffani Corrales MD Obstetrician    Halley Ndiaye, RN Primary Nurse    Deonna Kramer, RN Primary [place] Nurse    Hubert Tate, RN Charge Nurse    Stephan Michel, RN Staff Nurse    Rhoda Cuello, RN Staff Nurse    Mis Hernandes, RN Charge Nurse    Jyoti Freeman, RN NICU Nurse    Kenn Monzon MD Anesthesiologist     Nurse Anesthetist    Stephanie Cosme MD Neonatologist              [place] Assessment    Living Status: Living  Delivery Location Comment: 3311        Skin Color:   Heart Rate:   Reflex Irritability:   Muscle Tone:   Respiratory Effort:   Total:            1 Minute:    1    2    1    2    2    8         5 Minute:    1    2    2    2    2    9                                                 Resuscitation    Method: Bulb Suction, Stimulation, Suctioning, O2 Free Flow              Measurements               Skin to Skin      Reason Skin to Skin Not Initiated:  Acuity

## 2024-04-02 NOTE — ADDENDUM NOTE
Addendum  created 04/02/24 1532 by Tram Delgado APRN - CRNA    Intraprocedure Event edited, Intraprocedure Meds edited

## 2024-04-02 NOTE — OP NOTE
Operative Note  Patient - Stacy Redd  Medical Record Number - [id number]   YOB: 1995      DATE AND TIME OF PROCEDURE: [unfilled]   3:29 PM     PREOPERATIVE DIAGNOSIS: Fetal Intolerance of Labor    POSTOPERATIVE DIAGNOSIS: * No post-op diagnosis entered *    PROCEDURE(S): Procedure(s):   SECTION     ANESTHESIA: * No anesthesia type entered *    SURGEON:  Tiffani Corrales MD    ASSISTANT: Dr. Harman Palomino    QUANTITATIVE BLOOD LOSS AT PROCEDURE END: 600mL    COMPLICATIONS: none    IMPLANTS: *No implants in the log*    SPECIMENS: none    FINDINGS: In OR significant vaginal bleeding noted with fetal head at 0 station. On entry into abdomen, thin lower uterine segment noted. After hysterotomy, significant portion of cord was noted to be past fetal shoulders near fetal head/going into the vagina. Cord was very thin.       Prophylactic Antibiotics: Ancef and Azithro    DVT Prophylaxis: Sequential Compression Devices         Fetal Description: fowler     Birth Information:   Information for the patient's :  RAMA Redd [[id number]]   @[id number]@     Umbilical Cord: See above, large amount of cord ntoed to be past fetal shoulder going into the vagina/near fetal head. Cord was extremely thin.     Placenta:  manual removal        Procedure Detail:      After proper patient identification and consent, the patient was taken to the operating room, where epidural anesthesia was administered and found to be adequate.  The patient was prepped and draped in the normal sterile fashion.The abdomen was entered using the Curt thurston blunt technique. The peritoneum was entered bluntly well superior to the bladder without any apparent injury. A low transverse uterine incision was made with the scalpel and extended with blunt finger dissection. The baby’s head was then delivered atraumatically. The nose and mouth were suctioned. The cord was clamped and cut and the baby was handed

## 2024-04-02 NOTE — ANESTHESIA POSTPROCEDURE EVALUATION
Department of Anesthesiology  Postprocedure Note    Patient: Stacy Redd  MRN: [id number]  YOB: 1995  Date of evaluation: 2024    Procedure Summary       Date: 24 Room / Location: [facility] L&D  / [facility] L&D OR    Anesthesia Start:  Anesthesia Stop: 24 1502    Procedures:        SECTION      Labor Analgesia Diagnosis:       41 weeks gestation of pregnancy      (Fetal Intolerance of Labor)    Surgeons: Tiffani Corrales MD Responsible Provider: Kenn Monzon MD    Anesthesia Type: Epidural ASA Status: 2            Anesthesia Type: Epidural    Meri Phase I:      Meri Phase II:      Anesthesia Post Evaluation    Patient location during evaluation: PACU  Patient participation: complete - patient participated  Level of consciousness: awake  Airway patency: patent  Nausea & Vomiting: no vomiting  Cardiovascular status: hemodynamically stable  Respiratory status: acceptable  Hydration status: euvolemic    No notable events documented.

## 2024-04-02 NOTE — PROGRESS NOTES
Patient checked secondary to two deep variables. Cx 4/90/-1. Pitocin on 8 mU. SEYMOUR = 3.7. IUPC placed for management of Pitocin and amnioinfusion. Will amnioinfuse 500 cc over 45 minutes. Prior to onset of variables, Cat 1 tracing.

## 2024-04-03 LAB
ERYTHROCYTE [DISTWIDTH] IN BLOOD BY AUTOMATED COUNT: 12.4 % (ref 11.5–14.5)
HCT VFR BLD AUTO: 32.2 % (ref 35–47)
HGB BLD-MCNC: 11.2 G/DL (ref 11.5–16)
MCH RBC QN AUTO: 33.2 PG (ref 26–34)
MCHC RBC AUTO-ENTMCNC: 34.8 G/DL (ref 30–36.5)
MCV RBC AUTO: 95.5 FL (ref 80–99)
NRBC # BLD: 0 K/UL (ref 0–0.01)
NRBC BLD-RTO: 0 PER 100 WBC
PLATELET # BLD AUTO: 139 K/UL (ref 150–400)
PMV BLD AUTO: 10.4 FL (ref 8.9–12.9)
RBC # BLD AUTO: 3.37 M/UL (ref 3.8–5.2)
WBC # BLD AUTO: 19.7 K/UL (ref 3.6–11)

## 2024-04-03 PROCEDURE — 2580000003 HC RX 258: Performed by: STUDENT IN AN ORGANIZED HEALTH CARE EDUCATION/TRAINING PROGRAM

## 2024-04-03 PROCEDURE — 6370000000 HC RX 637 (ALT 250 FOR IP): Performed by: STUDENT IN AN ORGANIZED HEALTH CARE EDUCATION/TRAINING PROGRAM

## 2024-04-03 PROCEDURE — 1120000000 HC RM PRIVATE OB

## 2024-04-03 PROCEDURE — 36415 COLL VENOUS BLD VENIPUNCTURE: CPT

## 2024-04-03 PROCEDURE — 6360000002 HC RX W HCPCS: Performed by: STUDENT IN AN ORGANIZED HEALTH CARE EDUCATION/TRAINING PROGRAM

## 2024-04-03 PROCEDURE — 85027 COMPLETE CBC AUTOMATED: CPT

## 2024-04-03 RX ORDER — OXYCODONE HYDROCHLORIDE 5 MG/1
10 TABLET ORAL EVERY 4 HOURS PRN
Status: DISCONTINUED | OUTPATIENT
Start: 2024-04-03 | End: 2024-04-05 | Stop reason: HOSPADM

## 2024-04-03 RX ADMIN — DOCUSATE SODIUM 100 MG: 100 CAPSULE, LIQUID FILLED ORAL at 21:51

## 2024-04-03 RX ADMIN — ACETAMINOPHEN 1000 MG: 500 TABLET ORAL at 17:21

## 2024-04-03 RX ADMIN — KETOROLAC TROMETHAMINE 30 MG: 30 INJECTION, SOLUTION INTRAMUSCULAR at 08:06

## 2024-04-03 RX ADMIN — OXYCODONE 5 MG: 5 TABLET ORAL at 17:23

## 2024-04-03 RX ADMIN — FLUVOXAMINE MALEATE 100 MG: 50 TABLET, COATED ORAL at 21:51

## 2024-04-03 RX ADMIN — IBUPROFEN 800 MG: 400 TABLET, FILM COATED ORAL at 21:52

## 2024-04-03 RX ADMIN — DOCUSATE SODIUM 100 MG: 100 CAPSULE, LIQUID FILLED ORAL at 08:09

## 2024-04-03 RX ADMIN — SODIUM CHLORIDE, POTASSIUM CHLORIDE, SODIUM LACTATE AND CALCIUM CHLORIDE: 600; 310; 30; 20 INJECTION, SOLUTION INTRAVENOUS at 08:04

## 2024-04-03 ASSESSMENT — PAIN SCALES - GENERAL
PAINLEVEL_OUTOF10: 5
PAINLEVEL_OUTOF10: 5
PAINLEVEL_OUTOF10: 2

## 2024-04-03 ASSESSMENT — PAIN DESCRIPTION - LOCATION
LOCATION: ABDOMEN
LOCATION: ABDOMEN;INCISION;PERINEUM
LOCATION: ABDOMEN;INCISION;PERINEUM

## 2024-04-03 ASSESSMENT — PAIN DESCRIPTION - ORIENTATION
ORIENTATION: LOWER

## 2024-04-03 ASSESSMENT — PAIN DESCRIPTION - DESCRIPTORS
DESCRIPTORS: CRAMPING;DISCOMFORT;ACHING
DESCRIPTORS: ACHING;CRAMPING;DISCOMFORT
DESCRIPTORS: ACHING

## 2024-04-03 ASSESSMENT — PAIN - FUNCTIONAL ASSESSMENT
PAIN_FUNCTIONAL_ASSESSMENT: ACTIVITIES ARE NOT PREVENTED

## 2024-04-03 NOTE — PROGRESS NOTES
2146-  Pt c/o of right leg pain shortly after walking. Leg is not hot to the touch. MD paged and made aware. Instructed to put SCDS back on and reassess tomorrow morning. 10 mg of Compazine ordered per Dr. Palomino telephone order.    2158-   SCDs placed. Pt states Right leg pain feels better

## 2024-04-03 NOTE — PROGRESS NOTES
Duramorph Follow-Up Note    1 Day Post-Op sp Procedure(s):   SECTION.    /66   Pulse 70   Temp 98 °F (36.7 °C) (Axillary)   Resp 16   Ht 1.626 m (5' 4\")   Wt 77.1 kg (170 lb)   SpO2 100%   Breastfeeding Unknown   BMI 29.18 kg/m² .      Patient is POD-1 S/P epidural duramorph.  Pain is well controlled  Patient reports no headache, fever, weakness or numbness.  Epidural/spinal tap site is clean, dry and intact.  No obvious Anesthesia complications noted.    Plan:    Pain management as per primary service.

## 2024-04-03 NOTE — LACTATION NOTE
This note was copied from a baby's chart.     24 1230   Visit Information   Lactation Consult Visit Type IP Initial Consult   Visit Length 45 minutes   Reason for Visit Education;Normal [place] Visit   Breast Feeding History/Assessment   Left Breast Soft  (colostrum expressed)   Left Nipple Protrude  (Short)   Right Nipple Protrude  (Short)   Right Breast Soft   Breastfeeding History No   Feeding Assessment: Maternal Factors   Position and Latch With assistance   Left Side Feeding   Infant Latch Observations Rooting;Wide open mouth;Good latch on;Sustained rhythmic suck   Infant Position Cross cradle  (Laid back)   Right Side Feeding   Infant Latch Observations Rooting;Wide open mouth;Good latch on;Sustained rhythmic suck   LATCH Documentation   Latch 2   Audible Swallowing 2   Type of Nipple 2   Comfort (Breast/Nipple) 2   Hold (Positioning) 0   LATCH Score 8   Care Plan/Breast Care   Breast Care Lanolin provided;Pumping supply provided;Using breast pump   Lactation Comment Infant is 22 hours of age. Educated on proper latch and signs of milk transfer. Infant has received donor milk supplementation first few hours of life. Mom was not feelimg well initially.  Equipment: Medela pump and style, measured for Left breast 18-20 mm flange, Right 17-19 mm flange. Provided and demonstrated use of Medela hand pump and Medela Symphony.   oral assessment: WDL  Educated on pacifier use     Reviewed the \"Your Guide to Breastfeeding\" booklet. Discussed the typical feeding characteristics in the 1st and 2nd DOL and signs of adequate intake. Demonstrated the asymmetric latch and observed baby showing good signs of transfer on the breast. Discussed a feeding plan and mother's questions were addressed.    Plan:  Offer lots of skin to skin and access to the breast.  Feed baby at early signs of hunger every 2-3 hours.  Assure a deep latch, check that baby's lips are turned outward and use breast compression to keep baby

## 2024-04-03 NOTE — PLAN OF CARE
Problem: Pain  Goal: Verbalizes/displays adequate comfort level or baseline comfort level  Outcome: Progressing  Flowsheets  Taken 2024 2354 by Shruthi Valenzuela RN  Verbalizes/displays adequate comfort level or baseline comfort level:   Encourage patient to monitor pain and request assistance   Assess pain using appropriate pain scale  Taken 2024 2002 by Shruthi Valenzuela RN  Verbalizes/displays adequate comfort level or baseline comfort level:   Encourage patient to monitor pain and request assistance   Assess pain using appropriate pain scale  Taken 2024 1500 by Halley Ndiaye RN  Verbalizes/displays adequate comfort level or baseline comfort level:   Encourage patient to monitor pain and request assistance   Assess pain using appropriate pain scale   Administer analgesics based on type and severity of pain and evaluate response   Implement non-pharmacological measures as appropriate and evaluate response     Problem: Skin/Tissue Integrity  Goal: Absence of new skin breakdown  Description: 1.  Monitor for areas of redness and/or skin breakdown  2.  Assess vascular access sites hourly  3.  Every 4-6 hours minimum:  Change oxygen saturation probe site  4.  Every 4-6 hours:  If on nasal continuous positive airway pressure, respiratory therapy assess nares and determine need for appliance change or resting period.  Outcome: Progressing     Problem: Vaginal Birth or  Section  Goal: Fetal and maternal status remain reassuring during the birth process  Description:  Birth OB-Pregnancy care plan goal which identifies if the fetal and maternal status remain reassuring during the birth process  Outcome: Progressing     Problem: Postpartum  Goal: Experiences normal postpartum course  Description:  Postpartum OB-Pregnancy care plan goal which identifies if the mother is experiencing a normal postpartum course  Outcome: Progressing  Goal: Appropriate maternal -  bonding  Description:  Postpartum

## 2024-04-04 PROCEDURE — 6370000000 HC RX 637 (ALT 250 FOR IP): Performed by: STUDENT IN AN ORGANIZED HEALTH CARE EDUCATION/TRAINING PROGRAM

## 2024-04-04 PROCEDURE — 1120000000 HC RM PRIVATE OB

## 2024-04-04 RX ADMIN — IBUPROFEN 800 MG: 400 TABLET, FILM COATED ORAL at 20:46

## 2024-04-04 RX ADMIN — FLUVOXAMINE MALEATE 100 MG: 50 TABLET, COATED ORAL at 22:43

## 2024-04-04 RX ADMIN — ACETAMINOPHEN 1000 MG: 500 TABLET ORAL at 01:07

## 2024-04-04 RX ADMIN — DOCUSATE SODIUM 100 MG: 100 CAPSULE, LIQUID FILLED ORAL at 20:47

## 2024-04-04 ASSESSMENT — PAIN DESCRIPTION - LOCATION
LOCATION: ABDOMEN
LOCATION: ABDOMEN;BREAST
LOCATION: ABDOMEN

## 2024-04-04 ASSESSMENT — PAIN DESCRIPTION - DESCRIPTORS
DESCRIPTORS: CRAMPING
DESCRIPTORS: DISCOMFORT
DESCRIPTORS: DISCOMFORT

## 2024-04-04 ASSESSMENT — PAIN DESCRIPTION - ORIENTATION
ORIENTATION: LOWER
ORIENTATION: LOWER

## 2024-04-04 ASSESSMENT — PAIN DESCRIPTION - PAIN TYPE: TYPE: ACUTE PAIN

## 2024-04-04 ASSESSMENT — PAIN SCALES - GENERAL
PAINLEVEL_OUTOF10: 2
PAINLEVEL_OUTOF10: 6
PAINLEVEL_OUTOF10: 3

## 2024-04-04 ASSESSMENT — PAIN - FUNCTIONAL ASSESSMENT: PAIN_FUNCTIONAL_ASSESSMENT: ACTIVITIES ARE NOT PREVENTED

## 2024-04-04 ASSESSMENT — PAIN DESCRIPTION - FREQUENCY: FREQUENCY: INTERMITTENT

## 2024-04-04 NOTE — PROGRESS NOTES
Post-Operative  Day 2    Stacy Redd     Assessment: Post-Op day 2, doing well s/p stat PLTCS for cord prolapse    Plan:   - Routine post-operative care.  - h/o anxiety doing well on Fluvoxamine  - Plan for discharge Tomorrow.    Information for the patient's :  RAMA Redd [[id number]]   , Low Transverse   Patient doing well without significant complaint. Tolerating regular diet.  Ambulating.  Voiding without difficulty.    Vitals:  /72   Pulse 57   Temp 98.6 °F (37 °C) (Oral)   Resp 17   Ht 1.626 m (5' 4\")   Wt 77.1 kg (170 lb)   SpO2 96%   Breastfeeding Unknown   BMI 29.18 kg/m²   Temp (24hrs), Av.5 °F (36.9 °C), Min:98.3 °F (36.8 °C), Max:98.6 °F (37 °C)        Exam:       Patient without distress.                 Fundus firm, nontender per nursing fundal checks.     Bandage removed. Clean, dry, intact.                Perineum with normal lochia noted per nursing assessment.                Lower extremities are negative for pathological edema.    Labs:   Lab Results   Component Value Date/Time    WBC 19.7 2024 04:10 AM    WBC 10.8 2024 10:40 PM    HGB 11.2 2024 04:10 AM    HGB 13.7 2024 10:40 PM    HCT 32.2 2024 04:10 AM    HCT 39.0 2024 10:40 PM     2024 04:10 AM     2024 10:40 PM       No results found for this or any previous visit (from the past 24 hour(s)).

## 2024-04-05 VITALS
HEIGHT: 64 IN | SYSTOLIC BLOOD PRESSURE: 117 MMHG | RESPIRATION RATE: 20 BRPM | WEIGHT: 170 LBS | BODY MASS INDEX: 29.02 KG/M2 | TEMPERATURE: 99.1 F | DIASTOLIC BLOOD PRESSURE: 71 MMHG | OXYGEN SATURATION: 100 % | HEART RATE: 59 BPM

## 2024-04-05 PROCEDURE — 6370000000 HC RX 637 (ALT 250 FOR IP): Performed by: STUDENT IN AN ORGANIZED HEALTH CARE EDUCATION/TRAINING PROGRAM

## 2024-04-05 RX ORDER — ACETAMINOPHEN 500 MG
1000 TABLET ORAL EVERY 8 HOURS PRN
Qty: 360 TABLET | Refills: 1 | Status: SHIPPED | OUTPATIENT
Start: 2024-04-05

## 2024-04-05 RX ORDER — IBUPROFEN 800 MG/1
800 TABLET ORAL EVERY 8 HOURS PRN
Qty: 90 TABLET | Refills: 0 | Status: SHIPPED | OUTPATIENT
Start: 2024-04-05

## 2024-04-05 RX ADMIN — IBUPROFEN 800 MG: 400 TABLET, FILM COATED ORAL at 07:45

## 2024-04-05 RX ADMIN — DOCUSATE SODIUM 100 MG: 100 CAPSULE, LIQUID FILLED ORAL at 07:45

## 2024-04-05 ASSESSMENT — PAIN SCALES - GENERAL: PAINLEVEL_OUTOF10: 0

## 2024-04-05 NOTE — DISCHARGE SUMMARY
Obstetrical Discharge Summary     Name: Stacy Redd MRN: [id number]  SSN: [id number]    YOB: 1995  Age: 29 y.o.  Sex: female      Admit Date: 2024    Discharge Date: 2024     Admitting Physician: Severo Dove MD     Attending Physician:  Licha House*     Admission Diagnoses: Vaginal delivery [O80]    Discharge Diagnoses:   Information for the patient's :  RAMA Redd [[id number]]   @[id number]@      Additional Diagnoses:  No components found for: \"OBEXTABORH\", \"OBEXTABSCRN\", \"OBEXTRUBELLA\", \"OBEXTGRBS\"    Hospital Course: Normal hospital course following the delivery.    Patient Instructions:   Current Discharge Medication List        START taking these medications    Details   ibuprofen (ADVIL;MOTRIN) 800 MG tablet Take 1 tablet by mouth every 8 hours as needed for Pain  Qty: 90 tablet, Refills: 0      acetaminophen (TYLENOL) 500 MG tablet Take 2 tablets by mouth every 8 hours as needed for Pain  Qty: 360 tablet, Refills: 1           CONTINUE these medications which have NOT CHANGED    Details   fluvoxaMINE (LUVOX) 100 MG tablet Take 1 tablet by mouth nightly      Prenatal Vit-DSS-Fe Fum-FA (PNV FE FUM/DOCUSATE/FOLIC ACID PO) Take by mouth             Disposition at Discharge: Home or self care    Condition at Discharge: Stable    Reference my discharge instructions.    No follow-ups on file.     Signed By:  Licha House MD     2024

## 2024-04-05 NOTE — DISCHARGE INSTRUCTIONS
Breastfeeding: Care Instructions  Breastfeeding (sometimes called chestfeeding) is a skill that gets easier with practice. Try to be patient with yourself and your baby. You're both learning how to breastfeed.    Breastfeeding has many benefits. It can help you bond with your baby. It may lower your baby's chances of getting an infection.   If you have trouble breastfeeding, talk to your doctor, midwife, or lactation consultant. Support can also come from a trusted friend or family member who knows how to breastfeed.     Eat a variety of foods.    Choose vegetables, fruits, milk products, whole grains, and proteins.    Try to limit or avoid certain things.    Limit alcohol. It can pass through breast milk to your baby.  Avoid smoking, vaping, marijuana, and other drugs.  Try to reduce caffeine if your baby is fussy and has problems with sleep.  Avoid fish high in mercury. These include shark, swordfish, kam mackerel, marlin, orange roughy, and bigeye tuna, as well as tilefish from the [place].    Talk to your doctor about medicines and supplements.    Some can affect your breast milk or your baby.    Try different breastfeeding positions.    Find what works for you and your baby.  Different holds include cradle, cross-cradle, football, Australian, laid back, and side-lying.    Take care of yourself.    Take steps to prevent painful or cracked nipples.  Make sure your baby feeds with a good latch.  Ask for help if you're having pain while breastfeeding.  Try letting some breast milk dry on your nipples.  Rest when you can, and drink plenty of water. And ask for help if you need it.  When should you call for help?   Call your doctor now or seek immediate medical care if:    You have symptoms of a breast infection, such as:  Increased pain, swelling, redness, or warmth around a breast.  Red streaks extending from the breast.  Pus draining from a breast.  A fever.     Your baby has no wet diapers for 6

## 2024-04-05 NOTE — PROGRESS NOTES
Post-Operative  Day 3    Stacy Redd       Assessment: Post-Op day 3, doing well s/p stat pLTCS for cord prolapse.     Plan:   - Discharge home today.  - Follow up in office in 2 week(s) with [facility]'s [place] for mood check  - Pain medication prescription(s) sent.  - Questions answered.      Information for the patient's :  Tramaine, GIRL Stacy [[id number]]   , Low Transverse  Patient doing well without significant complaint. Tolerating regular diet.  Ambulating.  Voiding without difficulty.    Vitals:  /71   Pulse 59   Temp 99.1 °F (37.3 °C) (Oral)   Resp 20   Ht 1.626 m (5' 4\")   Wt 77.1 kg (170 lb)   SpO2 100%   Breastfeeding Unknown   BMI 29.18 kg/m²   Temp (24hrs), Av.5 °F (36.9 °C), Min:98.1 °F (36.7 °C), Max:99.1 °F (37.3 °C)        Exam:       Patient without distress.                 Fundus firm, nontender per nursing fundal checks.     Bandage removed. Clean, dry, intact.                Perineum with normal lochia noted per nursing assessment.                Lower extremities are negative for pathological edema.    Labs:   Lab Results   Component Value Date/Time    WBC 19.7 2024 04:10 AM    WBC 10.8 2024 10:40 PM    HGB 11.2 2024 04:10 AM    HGB 13.7 2024 10:40 PM    HCT 32.2 2024 04:10 AM    HCT 39.0 2024 10:40 PM     2024 04:10 AM     2024 10:40 PM       No results found for this or any previous visit (from the past 24 hour(s)).

## 2024-04-05 NOTE — LACTATION NOTE
This note was copied from a baby's chart.     04/05/24 0930   Visit Information   Lactation Consult Visit Type IP Consult Follow Up   Visit Length 15 minutes   Referral Received From Lactation Consultant Follow-up   Reason for Visit Education   Breast Feeding History/Assessment   Left Breast Filling   Left Nipple Protrude  (Short)   Right Nipple Protrude  (Short, brise in right areola)   Right Breast Filling   Breastfeeding History No   Feeding Assessment: Maternal Factors   Position and Latch Independently;Good technique   Maternal Response Comfortable   Left Side Feeding   Infant Latch Observations Rooting;Wide open mouth;Good latch on;Sustained rhythmic suck   Infant Position Cross cradle   LATCH Documentation   Latch 2   Audible Swallowing 2   Type of Nipple 2   Comfort (Breast/Nipple) 2   Hold (Positioning) 2   LATCH Score 10   Care Plan/Breast Care   Breast Care Using breast pump   Lactation Comment Infant is 68 hours of age. Observed a good latch at visit.     Reviewed the typical feeding characteristics in the 2nd DOL and signs of adequate intake. Mother states that breastfeeding has been going well. Discussed a feeding plan and how to manage breast engorgement. Mother's questions were addressed.    Goals: 1) Feed baby, 2) Reach full milk supply, 3) Baby transfer well on the breast    Offer lots of skin to skin and access to the breast  Feed baby at early signs of hunger every 2-3 hours  Assure a deep latch, check that baby's lips are turned outward and use breast compression to keep baby actively feeding  Offer a supplement of pumped breast milk via paced bottle feeding  Pump breasts for 15 minutes  Monitor wet and dirty diapers for signs of adequate intake  Discuss feeding plan with Pediatrician and make adjustments as needed    Lillie Olivera RN IBCLC

## 2024-10-21 ENCOUNTER — TELEPHONE (OUTPATIENT)
Age: 29
End: 2024-10-21

## 2024-10-21 NOTE — TELEPHONE ENCOUNTER
----- Message from Mariia RILEY sent at 10/21/2024 10:07 AM EDT -----  Regarding: [facility] Appointment Request  [facility] Appointment Request    Patient needs appointment for [facility] Appointment Type: New to Provider.    Patient Requested Dates(s): any date (earliest date)  Patient Requested Time: any time   Provider Name: Iza Fraser MD    Reason for Appointment Request: New Patient - No appointments available during search    Patient wants to schedule a appointment for a new to provider appoitnment .  --------------------------------------------------------------------------------------------------------------------------    Relationship to Patient: Self     Call Back Information: OK to leave message on voicemail  Preferred Call Back Number: Phone [phone]

## 2024-10-21 NOTE — PROGRESS NOTES
[facility] [place]  Office Note  Please note that this dictation was completed with [employer], the computer voice recognition software.  Quite often unanticipated grammatical, syntax, homophones, and other interpretive errors are inadvertently transcribed by the computer software.  Please disregard these errors.  Please excuse any errors that have escaped final proofreading.       History of present illness:Stacy Redd is a 29 y.o. female presenting for Jaw Pain (Right jaw pain x 1 1/2 weeks)      New patient, no priors. Moved from [place] one year ago.  She is here today for a referral to oral maxillofacial surgery at [facility].  She declines CPE    She reports right-sided jaw pain for almost 2 weeks and has been increasingly worse.  She has had difficulty eating and brushing teeth.  She has been taking Tylenol in the evening to help with pain.  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     Health maintenance  Well woman- [facility] [place], Pap-current    Review of Systems   Constitutional: Negative.    Respiratory:  Negative for shortness of breath.    Cardiovascular:  Negative for chest pain and leg swelling.   Neurological:  Negative for dizziness.         History reviewed. No pertinent past medical history.    No Known Allergies     Prior to Admission medications    Medication Sig Start Date End Date Taking? Authorizing Provider   ibuprofen (ADVIL;MOTRIN) 800 MG tablet Take 1 tablet by mouth every 8 hours as needed for Pain 4/5/24   Licha House MD   acetaminophen (TYLENOL) 500 MG tablet Take 2 tablets by mouth every 8 hours as needed for Pain 4/5/24   Licha House MD   fluvoxaMINE (LUVOX) 100 MG tablet Take 1 tablet by mouth nightly    ProviderNeftali MD   Prenatal Vit-DSS-Fe Fum-FA (PNV FE FUM/DOCUSATE/FOLIC ACID PO) Take by mouth    Neftali Sweeney MD        Vitals:    10/22/24 1424   BP: 122/70   Site: Left Upper Arm

## 2024-10-22 ENCOUNTER — OFFICE VISIT (OUTPATIENT)
Facility: CLINIC | Age: 29
End: 2024-10-22
Payer: COMMERCIAL

## 2024-10-22 VITALS
HEART RATE: 72 BPM | HEIGHT: 64 IN | RESPIRATION RATE: 16 BRPM | SYSTOLIC BLOOD PRESSURE: 122 MMHG | DIASTOLIC BLOOD PRESSURE: 70 MMHG | BODY MASS INDEX: 23.73 KG/M2 | TEMPERATURE: 98.4 F | WEIGHT: 139 LBS | OXYGEN SATURATION: 97 %

## 2024-10-22 DIAGNOSIS — Z98.890 HISTORY OF MANDIBULAR SURGERY: ICD-10-CM

## 2024-10-22 DIAGNOSIS — S03.00XA TMJ (DISLOCATION OF TEMPOROMANDIBULAR JOINT), INITIAL ENCOUNTER: Primary | ICD-10-CM

## 2024-10-22 PROCEDURE — 1036F TOBACCO NON-USER: CPT | Performed by: NURSE PRACTITIONER

## 2024-10-22 PROCEDURE — G8427 DOCREV CUR MEDS BY ELIG CLIN: HCPCS | Performed by: NURSE PRACTITIONER

## 2024-10-22 PROCEDURE — G8420 CALC BMI NORM PARAMETERS: HCPCS | Performed by: NURSE PRACTITIONER

## 2024-10-22 PROCEDURE — G8484 FLU IMMUNIZE NO ADMIN: HCPCS | Performed by: NURSE PRACTITIONER

## 2024-10-22 PROCEDURE — 99203 OFFICE O/P NEW LOW 30 MIN: CPT | Performed by: NURSE PRACTITIONER

## 2024-10-22 SDOH — ECONOMIC STABILITY: FOOD INSECURITY: WITHIN THE PAST 12 MONTHS, THE FOOD YOU BOUGHT JUST DIDN'T LAST AND YOU DIDN'T HAVE MONEY TO GET MORE.: NEVER TRUE

## 2024-10-22 SDOH — ECONOMIC STABILITY: FOOD INSECURITY: WITHIN THE PAST 12 MONTHS, YOU WORRIED THAT YOUR FOOD WOULD RUN OUT BEFORE YOU GOT MONEY TO BUY MORE.: NEVER TRUE

## 2024-10-22 SDOH — ECONOMIC STABILITY: INCOME INSECURITY: HOW HARD IS IT FOR YOU TO PAY FOR THE VERY BASICS LIKE FOOD, HOUSING, MEDICAL CARE, AND HEATING?: NOT HARD AT ALL

## 2024-10-22 ASSESSMENT — PATIENT HEALTH QUESTIONNAIRE - PHQ9
SUM OF ALL RESPONSES TO PHQ QUESTIONS 1-9: 0
2. FEELING DOWN, DEPRESSED OR HOPELESS: NOT AT ALL
SUM OF ALL RESPONSES TO PHQ QUESTIONS 1-9: 0
1. LITTLE INTEREST OR PLEASURE IN DOING THINGS: NOT AT ALL
SUM OF ALL RESPONSES TO PHQ QUESTIONS 1-9: 0
SUM OF ALL RESPONSES TO PHQ9 QUESTIONS 1 & 2: 0
SUM OF ALL RESPONSES TO PHQ QUESTIONS 1-9: 0

## 2024-10-22 ASSESSMENT — ENCOUNTER SYMPTOMS: SHORTNESS OF BREATH: 0

## 2024-10-22 NOTE — PROGRESS NOTES
Stacy Redd is a 29 y.o. female     Chief Complaint   Patient presents with    Jaw Pain     Right jaw pain x 1 1/2 weeks       /70 (Site: Left Upper Arm, Position: Sitting, Cuff Size: Medium Adult)   Pulse 72   Temp 98.4 °F (36.9 °C) (Oral)   Resp 16   Ht 1.626 m (5' 4\")   Wt 63 kg (139 lb)   SpO2 97%   BMI 23.86 kg/m²     Health Maintenance Due   Topic Date Due    Depression Screen  Never done    Varicella vaccine (1 of 2 - 13+ 2-dose series) Never done    Hepatitis C screen  Never done    Hepatitis B vaccine (1 of 3 - 19+ 3-dose series) Never done    DTaP/Tdap/Td vaccine (1 - Tdap) Never done    Pap smear  Never done    Flu vaccine (1) Never done    COVID-19 Vaccine (1 - 2023-24 season) Never done         \"Have you been to the ER, urgent care clinic since your last visit?  Hospitalized since your last visit?\"    NO    “Have you seen or consulted any other health care providers outside of [facility] since your last visit?”    NO        “Have you had a pap smear?”    YES - Where: [facility] Nurse/CMA to request most recent records if not in the chart    No cervical cancer screening on file

## 2025-02-26 LAB
ABO, EXTERNAL RESULT: NORMAL
C. TRACHOMATIS, EXTERNAL RESULT: NEGATIVE
HEP B, EXTERNAL RESULT: NON REACTIVE
HEPATITIS C ANTIBODY, EXTERNAL RESULT: NEGATIVE
HIV, EXTERNAL RESULT: NON REACTIVE
N. GONORRHOEAE, EXTERNAL RESULT: NEGATIVE
RH FACTOR, EXTERNAL RESULT: NORMAL
RUBELLA TITER, EXTERNAL RESULT: NORMAL

## 2025-06-12 LAB — T. PALLIDUM (SYPHILIS) ANTIBODY, EXTERNAL RESULT: NON REACTIVE

## 2025-08-03 LAB — GBS, EXTERNAL RESULT: POSITIVE

## 2025-08-25 ENCOUNTER — HOSPITAL ENCOUNTER (INPATIENT)
Facility: HOSPITAL | Age: 30
LOS: 2 days | Discharge: HOME | End: 2025-08-27
Attending: EMERGENCY MEDICINE | Admitting: OBSTETRICS & GYNECOLOGY
Payer: COMMERCIAL

## 2025-08-25 ENCOUNTER — ANESTHESIA (OUTPATIENT)
Dept: LABOR AND DELIVERY | Facility: HOSPITAL | Age: 30
End: 2025-08-25
Payer: COMMERCIAL

## 2025-08-25 ENCOUNTER — ANESTHESIA EVENT (OUTPATIENT)
Dept: LABOR AND DELIVERY | Facility: HOSPITAL | Age: 30
End: 2025-08-25
Payer: COMMERCIAL

## 2025-08-25 DIAGNOSIS — Z3A.40 40 WEEKS GESTATION OF PREGNANCY: Primary | ICD-10-CM

## 2025-08-25 LAB
ABO + RH BLD: NORMAL
ALBUMIN SERPL-MCNC: 2.9 G/DL (ref 3.5–5.2)
ALBUMIN/GLOB SERPL: 0.7 (ref 1.1–2.2)
ALP SERPL-CCNC: 113 U/L (ref 35–104)
ALT SERPL-CCNC: 19 U/L (ref 10–35)
AMPHET UR QL SCN: NEGATIVE
ANION GAP SERPL CALC-SCNC: 15 MMOL/L (ref 2–14)
AST SERPL-CCNC: 25 U/L (ref 10–35)
BARBITURATES UR QL SCN: NEGATIVE
BENZODIAZ UR QL: NEGATIVE
BILIRUB SERPL-MCNC: 0.3 MG/DL (ref 0–1.2)
BLOOD GROUP ANTIBODIES SERPL: NORMAL
BUN SERPL-MCNC: 9 MG/DL (ref 6–20)
BUN/CREAT SERPL: 16 (ref 12–20)
CALCIUM SERPL-MCNC: 9 MG/DL (ref 8.6–10)
CANNABINOIDS UR QL SCN: NEGATIVE
CHLORIDE SERPL-SCNC: 103 MMOL/L (ref 98–107)
CO2 SERPL-SCNC: 18 MMOL/L (ref 20–29)
COCAINE UR QL SCN: NEGATIVE
CREAT SERPL-MCNC: 0.56 MG/DL (ref 0.6–1)
FENTANYL: NEGATIVE
GLOBULIN SER CALC-MCNC: 4 G/DL (ref 2–4)
GLUCOSE SERPL-MCNC: 100 MG/DL (ref 65–100)
Lab: NORMAL
METHADONE UR QL: NEGATIVE
OPIATES UR QL: NEGATIVE
PCP UR QL: NEGATIVE
POTASSIUM SERPL-SCNC: 3.7 MMOL/L (ref 3.5–5.1)
PROT SERPL-MCNC: 6.8 G/DL (ref 6.4–8.3)
SODIUM SERPL-SCNC: 135 MMOL/L (ref 136–145)
SPECIMEN EXP DATE BLD: NORMAL

## 2025-08-25 PROCEDURE — 7220000101 HC DELIVERY VAGINAL/SINGLE

## 2025-08-25 PROCEDURE — 86780 TREPONEMA PALLIDUM: CPT

## 2025-08-25 PROCEDURE — 1120000000 HC RM PRIVATE OB

## 2025-08-25 PROCEDURE — 6370000000 HC RX 637 (ALT 250 FOR IP): Performed by: OBSTETRICS & GYNECOLOGY

## 2025-08-25 PROCEDURE — 86850 RBC ANTIBODY SCREEN: CPT

## 2025-08-25 PROCEDURE — 86901 BLOOD TYPING SEROLOGIC RH(D): CPT

## 2025-08-25 PROCEDURE — 51702 INSERT TEMP BLADDER CATH: CPT

## 2025-08-25 PROCEDURE — 6360000002 HC RX W HCPCS: Performed by: OBSTETRICS & GYNECOLOGY

## 2025-08-25 PROCEDURE — 36415 COLL VENOUS BLD VENIPUNCTURE: CPT

## 2025-08-25 PROCEDURE — 7210000100 HC LABOR FEE PER 1 HR

## 2025-08-25 PROCEDURE — 86900 BLOOD TYPING SEROLOGIC ABO: CPT

## 2025-08-25 PROCEDURE — 4500000002 HC ER NO CHARGE

## 2025-08-25 PROCEDURE — 3700000156 HC EPIDURAL ANESTHESIA

## 2025-08-25 PROCEDURE — 80053 COMPREHEN METABOLIC PANEL: CPT

## 2025-08-25 PROCEDURE — 3700000025 EPIDURAL BLOCK: Performed by: STUDENT IN AN ORGANIZED HEALTH CARE EDUCATION/TRAINING PROGRAM

## 2025-08-25 PROCEDURE — 2580000003 HC RX 258: Performed by: OBSTETRICS & GYNECOLOGY

## 2025-08-25 PROCEDURE — 2500000003 HC RX 250 WO HCPCS: Performed by: NURSE ANESTHETIST, CERTIFIED REGISTERED

## 2025-08-25 PROCEDURE — 2500000003 HC RX 250 WO HCPCS: Performed by: STUDENT IN AN ORGANIZED HEALTH CARE EDUCATION/TRAINING PROGRAM

## 2025-08-25 PROCEDURE — 4A1HXCZ MONITORING OF PRODUCTS OF CONCEPTION, CARDIAC RATE, EXTERNAL APPROACH: ICD-10-PCS | Performed by: OBSTETRICS & GYNECOLOGY

## 2025-08-25 PROCEDURE — 0KQM0ZZ REPAIR PERINEUM MUSCLE, OPEN APPROACH: ICD-10-PCS | Performed by: OBSTETRICS & GYNECOLOGY

## 2025-08-25 RX ORDER — ACETAMINOPHEN 500 MG
1000 TABLET ORAL EVERY 8 HOURS
Status: DISCONTINUED | OUTPATIENT
Start: 2025-08-25 | End: 2025-08-27 | Stop reason: HOSPADM

## 2025-08-25 RX ORDER — TERBUTALINE SULFATE 1 MG/ML
0.25 INJECTION SUBCUTANEOUS
Status: DISCONTINUED | OUTPATIENT
Start: 2025-08-25 | End: 2025-08-25

## 2025-08-25 RX ORDER — SODIUM CHLORIDE 0.9 % (FLUSH) 0.9 %
5-40 SYRINGE (ML) INJECTION PRN
Status: DISCONTINUED | OUTPATIENT
Start: 2025-08-25 | End: 2025-08-25

## 2025-08-25 RX ORDER — CARBOPROST TROMETHAMINE 250 UG/ML
250 INJECTION, SOLUTION INTRAMUSCULAR PRN
Status: DISCONTINUED | OUTPATIENT
Start: 2025-08-25 | End: 2025-08-27 | Stop reason: HOSPADM

## 2025-08-25 RX ORDER — SODIUM CHLORIDE, SODIUM LACTATE, POTASSIUM CHLORIDE, AND CALCIUM CHLORIDE .6; .31; .03; .02 G/100ML; G/100ML; G/100ML; G/100ML
500 INJECTION, SOLUTION INTRAVENOUS PRN
Status: DISCONTINUED | OUTPATIENT
Start: 2025-08-25 | End: 2025-08-25

## 2025-08-25 RX ORDER — DOCUSATE SODIUM 100 MG/1
100 CAPSULE, LIQUID FILLED ORAL 2 TIMES DAILY
Status: DISCONTINUED | OUTPATIENT
Start: 2025-08-25 | End: 2025-08-27 | Stop reason: HOSPADM

## 2025-08-25 RX ORDER — ONDANSETRON 4 MG/1
4 TABLET, ORALLY DISINTEGRATING ORAL EVERY 6 HOURS PRN
Status: DISCONTINUED | OUTPATIENT
Start: 2025-08-25 | End: 2025-08-27 | Stop reason: HOSPADM

## 2025-08-25 RX ORDER — FENTANYL/BUPIVACAINE/NS/PF 2-1250MCG
12 PLASTIC BAG, INJECTION (ML) INJECTION CONTINUOUS
Refills: 0 | Status: DISCONTINUED | OUTPATIENT
Start: 2025-08-25 | End: 2025-08-25

## 2025-08-25 RX ORDER — ACETAMINOPHEN 325 MG/1
650 TABLET ORAL EVERY 4 HOURS PRN
Status: DISCONTINUED | OUTPATIENT
Start: 2025-08-25 | End: 2025-08-25

## 2025-08-25 RX ORDER — SODIUM CHLORIDE, SODIUM LACTATE, POTASSIUM CHLORIDE, CALCIUM CHLORIDE 600; 310; 30; 20 MG/100ML; MG/100ML; MG/100ML; MG/100ML
INJECTION, SOLUTION INTRAVENOUS CONTINUOUS
Status: DISCONTINUED | OUTPATIENT
Start: 2025-08-25 | End: 2025-08-25

## 2025-08-25 RX ORDER — SODIUM CHLORIDE 0.9 % (FLUSH) 0.9 %
5-40 SYRINGE (ML) INJECTION EVERY 12 HOURS SCHEDULED
Status: DISCONTINUED | OUTPATIENT
Start: 2025-08-25 | End: 2025-08-25

## 2025-08-25 RX ORDER — ONDANSETRON 2 MG/ML
4 INJECTION INTRAMUSCULAR; INTRAVENOUS EVERY 6 HOURS PRN
Status: DISCONTINUED | OUTPATIENT
Start: 2025-08-25 | End: 2025-08-25 | Stop reason: SDUPTHER

## 2025-08-25 RX ORDER — ONDANSETRON 4 MG/1
4 TABLET, ORALLY DISINTEGRATING ORAL EVERY 6 HOURS PRN
Status: DISCONTINUED | OUTPATIENT
Start: 2025-08-25 | End: 2025-08-25

## 2025-08-25 RX ORDER — MISOPROSTOL 200 UG/1
400 TABLET ORAL PRN
Status: DISCONTINUED | OUTPATIENT
Start: 2025-08-25 | End: 2025-08-27 | Stop reason: HOSPADM

## 2025-08-25 RX ORDER — METHYLERGONOVINE MALEATE 0.2 MG/ML
200 INJECTION INTRAVENOUS PRN
Status: DISCONTINUED | OUTPATIENT
Start: 2025-08-25 | End: 2025-08-27 | Stop reason: HOSPADM

## 2025-08-25 RX ORDER — ONDANSETRON 2 MG/ML
4 INJECTION INTRAMUSCULAR; INTRAVENOUS EVERY 6 HOURS PRN
Status: DISCONTINUED | OUTPATIENT
Start: 2025-08-25 | End: 2025-08-25

## 2025-08-25 RX ORDER — LOPERAMIDE HYDROCHLORIDE 2 MG/1
2 CAPSULE ORAL PRN
Status: DISCONTINUED | OUTPATIENT
Start: 2025-08-25 | End: 2025-08-25

## 2025-08-25 RX ORDER — ONDANSETRON 2 MG/ML
4 INJECTION INTRAMUSCULAR; INTRAVENOUS EVERY 6 HOURS PRN
Status: DISCONTINUED | OUTPATIENT
Start: 2025-08-25 | End: 2025-08-27 | Stop reason: HOSPADM

## 2025-08-25 RX ORDER — OXYCODONE HYDROCHLORIDE 5 MG/1
10 TABLET ORAL EVERY 4 HOURS PRN
Status: DISCONTINUED | OUTPATIENT
Start: 2025-08-25 | End: 2025-08-27 | Stop reason: HOSPADM

## 2025-08-25 RX ORDER — SODIUM CHLORIDE 9 MG/ML
25 INJECTION, SOLUTION INTRAVENOUS PRN
Status: DISCONTINUED | OUTPATIENT
Start: 2025-08-25 | End: 2025-08-25

## 2025-08-25 RX ORDER — IBUPROFEN 400 MG/1
800 TABLET, FILM COATED ORAL EVERY 8 HOURS
Status: DISCONTINUED | OUTPATIENT
Start: 2025-08-25 | End: 2025-08-27 | Stop reason: HOSPADM

## 2025-08-25 RX ORDER — SODIUM CHLORIDE 9 MG/ML
INJECTION, SOLUTION INTRAVENOUS PRN
Status: DISCONTINUED | OUTPATIENT
Start: 2025-08-25 | End: 2025-08-25

## 2025-08-25 RX ORDER — EPHEDRINE SULFATE/0.9% NACL/PF 25 MG/5 ML
10 SYRINGE (ML) INTRAVENOUS
Status: DISCONTINUED | OUTPATIENT
Start: 2025-08-25 | End: 2025-08-25

## 2025-08-25 RX ORDER — OXYCODONE HYDROCHLORIDE 5 MG/1
5 TABLET ORAL EVERY 4 HOURS PRN
Status: DISCONTINUED | OUTPATIENT
Start: 2025-08-25 | End: 2025-08-27 | Stop reason: HOSPADM

## 2025-08-25 RX ORDER — BUPIVACAINE HYDROCHLORIDE AND EPINEPHRINE 2.5; 5 MG/ML; UG/ML
INJECTION, SOLUTION EPIDURAL; INFILTRATION; INTRACAUDAL; PERINEURAL
Status: DISCONTINUED | OUTPATIENT
Start: 2025-08-25 | End: 2025-08-25 | Stop reason: SDUPTHER

## 2025-08-25 RX ADMIN — Medication 12 ML/HR: at 08:44

## 2025-08-25 RX ADMIN — PENICILLIN G POTASSIUM 5 MILLION UNITS: 5000000 INJECTION, POWDER, FOR SOLUTION INTRAMUSCULAR; INTRAVENOUS at 09:35

## 2025-08-25 RX ADMIN — IBUPROFEN 800 MG: 400 TABLET, FILM COATED ORAL at 19:33

## 2025-08-25 RX ADMIN — BUPIVACAINE HYDROCHLORIDE AND EPINEPHRINE 5 ML: 2.5; 5 INJECTION, SOLUTION EPIDURAL; INFILTRATION; INTRACAUDAL; PERINEURAL at 08:34

## 2025-08-25 RX ADMIN — DOCUSATE SODIUM 100 MG: 100 CAPSULE, LIQUID FILLED ORAL at 21:07

## 2025-08-25 RX ADMIN — BUPIVACAINE HYDROCHLORIDE AND EPINEPHRINE 3 ML: 2.5; 5 INJECTION, SOLUTION EPIDURAL; INFILTRATION; INTRACAUDAL; PERINEURAL at 08:30

## 2025-08-25 RX ADMIN — BUPIVACAINE HYDROCHLORIDE AND EPINEPHRINE 5 ML: 2.5; 5 INJECTION, SOLUTION EPIDURAL; INFILTRATION; INTRACAUDAL; PERINEURAL at 08:32

## 2025-08-25 ASSESSMENT — PAIN SCALES - GENERAL: PAINLEVEL_OUTOF10: 0

## 2025-08-26 LAB
INTERPRETATION: NORMAL
T PALLIDUM AB SER QL IA: NON REACTIVE

## 2025-08-26 PROCEDURE — 1120000000 HC RM PRIVATE OB

## 2025-08-26 PROCEDURE — 6370000000 HC RX 637 (ALT 250 FOR IP): Performed by: OBSTETRICS & GYNECOLOGY

## 2025-08-26 RX ORDER — FLUVOXAMINE MALEATE 50 MG
100 TABLET ORAL NIGHTLY
Status: DISCONTINUED | OUTPATIENT
Start: 2025-08-26 | End: 2025-08-27 | Stop reason: HOSPADM

## 2025-08-26 RX ADMIN — IBUPROFEN 800 MG: 400 TABLET, FILM COATED ORAL at 06:32

## 2025-08-26 RX ADMIN — ACETAMINOPHEN 1000 MG: 500 TABLET ORAL at 02:05

## 2025-08-26 RX ADMIN — DOCUSATE SODIUM 100 MG: 100 CAPSULE, LIQUID FILLED ORAL at 10:47

## 2025-08-26 RX ADMIN — ACETAMINOPHEN 1000 MG: 500 TABLET ORAL at 10:47

## 2025-08-26 RX ADMIN — IBUPROFEN 800 MG: 400 TABLET, FILM COATED ORAL at 15:12

## 2025-08-26 RX ADMIN — DOCUSATE SODIUM 100 MG: 100 CAPSULE, LIQUID FILLED ORAL at 20:46

## 2025-08-26 ASSESSMENT — PAIN - FUNCTIONAL ASSESSMENT
PAIN_FUNCTIONAL_ASSESSMENT: 0-10
PAIN_FUNCTIONAL_ASSESSMENT: 0-10

## 2025-08-26 ASSESSMENT — PAIN SCALES - GENERAL
PAINLEVEL_OUTOF10: 0
PAINLEVEL_OUTOF10: 0

## 2025-08-27 ENCOUNTER — TELEPHONE (OUTPATIENT)
Facility: CLINIC | Age: 30
End: 2025-08-27

## 2025-08-27 VITALS
BODY MASS INDEX: 29.52 KG/M2 | SYSTOLIC BLOOD PRESSURE: 106 MMHG | TEMPERATURE: 98.2 F | WEIGHT: 172 LBS | HEART RATE: 67 BPM | OXYGEN SATURATION: 98 % | RESPIRATION RATE: 17 BRPM | DIASTOLIC BLOOD PRESSURE: 64 MMHG

## 2025-08-27 PROCEDURE — 6370000000 HC RX 637 (ALT 250 FOR IP): Performed by: OBSTETRICS & GYNECOLOGY

## 2025-08-27 RX ADMIN — IBUPROFEN 800 MG: 400 TABLET, FILM COATED ORAL at 00:29

## 2025-08-27 RX ADMIN — ACETAMINOPHEN 500 MG: 500 TABLET ORAL at 04:50

## (undated) DEVICE — SOLIDIFIER FLD 3.2OZ 3000CC TRAD IN BTL LIQUI-LOC

## (undated) DEVICE — SPONGE LAPAROTOMY W18XL18IN WHITE STRUNG RADIOPAQUE STERILE

## (undated) DEVICE — PAD,ABDOMINAL,5"X9",ST,LF,25/BX: Brand: MEDLINE INDUSTRIES, INC.

## (undated) DEVICE — ELECTRODE PT RET AD L9FT HI MOIST COND ADH HYDRGEL CORDED

## (undated) DEVICE — LARGE, DISPOSABLE ALEXIS O C-SECTION PROTECTOR - RETRACTOR: Brand: ALEXIS ® O C-SECTION PROTECTOR - RETRACTOR

## (undated) DEVICE — CANISTER, RIGID, 3000CC: Brand: MEDLINE INDUSTRIES, INC.

## (undated) DEVICE — PENCIL ES L3M BTTN SWCH S STL HEX LOK BLDE ELECTRD HOLSTER

## (undated) DEVICE — CLEANER ES TIP W2XL2IN ADH BK RADPQ FOR S STL ELECTRD

## (undated) DEVICE — SUTURE VCRL SZ 0 L36IN ABSRB UD L40MM CT 1/2 CIR TAPERPOINT J958H

## (undated) DEVICE — 1LYRTR 16FR10ML 100%SILI SNAP: Brand: MEDLINE INDUSTRIES, INC.

## (undated) DEVICE — Device

## (undated) DEVICE — SUTURE MCRYL SZ 3-0 L27IN ABSRB UD L19MM PS-2 3/8 CIR PRIM Y427H

## (undated) DEVICE — STAPLER SKIN SQ 30 ABSRB STPL DISP INSORB ORDER VIA PHONE OR EMAIL

## (undated) DEVICE — SUTURE VCRL + SZ 1 L27IN ABSRB VLT L36MM CT-1 1/2 CIR VCP341H

## (undated) DEVICE — SUTURE VCRL SZ 1 L36IN ABSRB VLT L48MM CTX 1/2 CIR J371H

## (undated) DEVICE — TUBING, SUCTION, 1/4" X 10', STRAIGHT: Brand: MEDLINE

## (undated) DEVICE — C-SECTION II-LF: Brand: MEDLINE INDUSTRIES, INC.

## (undated) DEVICE — APPLICATOR MEDICATED 26 CC SOLUTION HI LT ORNG CHLORAPREP

## (undated) DEVICE — C-SECTION MRMC: Brand: MEDLINE INDUSTRIES, INC.

## (undated) DEVICE — SOLUTION IRRIG 1000ML 0.9% SOD CHL USP POUR PLAS BTL